# Patient Record
Sex: MALE | Race: WHITE | NOT HISPANIC OR LATINO | URBAN - NONMETROPOLITAN AREA
[De-identification: names, ages, dates, MRNs, and addresses within clinical notes are randomized per-mention and may not be internally consistent; named-entity substitution may affect disease eponyms.]

---

## 2019-04-25 ENCOUNTER — IMPORTED ENCOUNTER (OUTPATIENT)
Dept: URBAN - NONMETROPOLITAN AREA CLINIC 1 | Facility: CLINIC | Age: 54
End: 2019-04-25

## 2019-04-25 PROBLEM — H35.3131: Noted: 2019-04-25

## 2019-04-25 PROBLEM — H52.03: Noted: 2019-04-25

## 2019-04-25 PROBLEM — H52.4: Noted: 2019-04-25

## 2019-04-25 PROBLEM — H25.13: Noted: 2019-04-25

## 2019-04-25 PROCEDURE — 92015 DETERMINE REFRACTIVE STATE: CPT

## 2019-04-25 PROCEDURE — 92014 COMPRE OPH EXAM EST PT 1/>: CPT

## 2019-04-25 NOTE — PATIENT DISCUSSION
Simple Hyperopia OU w/Presbyopia-  discussed findings w/patient-  new spectacle Rx issued-  monitor yearly or prn Early ARMD OU -  discussed findings w/patient-  family history-  recommend daily multivitamin-  continue without AREDS and AG at this time-  monitor 1 year w/ OCT mac or prn; 's Notes: MR 4/25/2019DFE 4/25/2019

## 2021-06-16 ENCOUNTER — IMPORTED ENCOUNTER (OUTPATIENT)
Dept: URBAN - NONMETROPOLITAN AREA CLINIC 1 | Facility: CLINIC | Age: 56
End: 2021-06-16

## 2021-06-16 PROCEDURE — 92015 DETERMINE REFRACTIVE STATE: CPT

## 2021-06-16 PROCEDURE — 92014 COMPRE OPH EXAM EST PT 1/>: CPT

## 2021-06-16 NOTE — PATIENT DISCUSSION
Simple Hyperopia OU w/Presbyopia-  discussed findings w/patient-  new spectacle Rx issued-  monitor yearly or prn Early ARMD OU -  discussed findings w/patient-  family history-  recommend daily multivitamin-  continue without AREDS and AG at this time-  monitor 1 year w/ OCT mac or prn; 's Notes: MR 6/16/2021D 6/16/2021

## 2022-04-15 ASSESSMENT — TONOMETRY
OD_IOP_MMHG: 17
OD_IOP_MMHG: 15
OS_IOP_MMHG: 17
OS_IOP_MMHG: 15

## 2022-04-15 ASSESSMENT — VISUAL ACUITY
OS_SC: 20/20
OS_SC: 20/25+2
OD_SC: 20/20
OD_SC: 20/20
OU_CC: 20/20

## 2023-01-30 ENCOUNTER — ESTABLISHED PATIENT (OUTPATIENT)
Dept: RURAL CLINIC 1 | Facility: CLINIC | Age: 58
End: 2023-01-30

## 2023-01-30 DIAGNOSIS — H25.13: ICD-10-CM

## 2023-01-30 DIAGNOSIS — H35.3131: ICD-10-CM

## 2023-01-30 DIAGNOSIS — H52.4: ICD-10-CM

## 2023-01-30 DIAGNOSIS — H52.03: ICD-10-CM

## 2023-01-30 PROCEDURE — 92014 COMPRE OPH EXAM EST PT 1/>: CPT

## 2023-01-30 PROCEDURE — 92015 DETERMINE REFRACTIVE STATE: CPT

## 2023-01-30 ASSESSMENT — VISUAL ACUITY
OU_CC: 20/20-1
OD_CC: 20/20
OD_CC: 20/20-1
OS_CC: 20/20-1
OS_CC: 20/25-1
OU_CC: 20/20

## 2023-01-30 ASSESSMENT — TONOMETRY
OD_IOP_MMHG: 17
OS_IOP_MMHG: 17

## 2025-03-11 ENCOUNTER — COMPREHENSIVE EXAM (OUTPATIENT)
Age: 60
End: 2025-03-11

## 2025-03-11 DIAGNOSIS — H52.03: ICD-10-CM

## 2025-03-11 DIAGNOSIS — H25.13: ICD-10-CM

## 2025-03-11 DIAGNOSIS — H35.3131: ICD-10-CM

## 2025-03-11 DIAGNOSIS — H52.4: ICD-10-CM

## 2025-03-11 PROCEDURE — 92014 COMPRE OPH EXAM EST PT 1/>: CPT

## 2025-03-11 PROCEDURE — 92015 DETERMINE REFRACTIVE STATE: CPT
